# Patient Record
Sex: MALE | Race: ASIAN | NOT HISPANIC OR LATINO | Employment: OTHER | URBAN - METROPOLITAN AREA
[De-identification: names, ages, dates, MRNs, and addresses within clinical notes are randomized per-mention and may not be internally consistent; named-entity substitution may affect disease eponyms.]

---

## 2024-03-15 ENCOUNTER — OFFICE VISIT (OUTPATIENT)
Dept: URGENT CARE | Facility: PHYSICIAN GROUP | Age: 74
End: 2024-03-15
Payer: COMMERCIAL

## 2024-03-15 ENCOUNTER — HOSPITAL ENCOUNTER (OUTPATIENT)
Facility: MEDICAL CENTER | Age: 74
End: 2024-03-15
Attending: PHYSICIAN ASSISTANT
Payer: COMMERCIAL

## 2024-03-15 VITALS
SYSTOLIC BLOOD PRESSURE: 114 MMHG | TEMPERATURE: 98.2 F | HEIGHT: 70 IN | WEIGHT: 212 LBS | DIASTOLIC BLOOD PRESSURE: 62 MMHG | HEART RATE: 97 BPM | RESPIRATION RATE: 18 BRPM | OXYGEN SATURATION: 97 % | BODY MASS INDEX: 30.35 KG/M2

## 2024-03-15 DIAGNOSIS — N30.01 ACUTE CYSTITIS WITH HEMATURIA: ICD-10-CM

## 2024-03-15 LAB
APPEARANCE UR: ABNORMAL
BILIRUB UR STRIP-MCNC: NEGATIVE MG/DL
COLOR UR AUTO: ABNORMAL
GLUCOSE UR STRIP.AUTO-MCNC: NEGATIVE MG/DL
KETONES UR STRIP.AUTO-MCNC: NEGATIVE MG/DL
LEUKOCYTE ESTERASE UR QL STRIP.AUTO: ABNORMAL
NITRITE UR QL STRIP.AUTO: POSITIVE
PH UR STRIP.AUTO: 6 [PH] (ref 5–8)
PROT UR QL STRIP: 100 MG/DL
RBC UR QL AUTO: ABNORMAL
SP GR UR STRIP.AUTO: 1.01
UROBILINOGEN UR STRIP-MCNC: 2 MG/DL

## 2024-03-15 PROCEDURE — 3074F SYST BP LT 130 MM HG: CPT | Performed by: PHYSICIAN ASSISTANT

## 2024-03-15 PROCEDURE — 99203 OFFICE O/P NEW LOW 30 MIN: CPT | Performed by: PHYSICIAN ASSISTANT

## 2024-03-15 PROCEDURE — 87186 SC STD MICRODIL/AGAR DIL: CPT

## 2024-03-15 PROCEDURE — 87077 CULTURE AEROBIC IDENTIFY: CPT

## 2024-03-15 PROCEDURE — 3078F DIAST BP <80 MM HG: CPT | Performed by: PHYSICIAN ASSISTANT

## 2024-03-15 PROCEDURE — 81002 URINALYSIS NONAUTO W/O SCOPE: CPT | Performed by: PHYSICIAN ASSISTANT

## 2024-03-15 PROCEDURE — 87086 URINE CULTURE/COLONY COUNT: CPT

## 2024-03-15 RX ORDER — SULFAMETHOXAZOLE AND TRIMETHOPRIM 800; 160 MG/1; MG/1
1 TABLET ORAL 2 TIMES DAILY
Qty: 28 TABLET | Refills: 0 | Status: SHIPPED | OUTPATIENT
Start: 2024-03-15 | End: 2024-03-29

## 2024-03-15 RX ORDER — TIRZEPATIDE 7.5 MG/.5ML
INJECTION, SOLUTION SUBCUTANEOUS
COMMUNITY

## 2024-03-15 ASSESSMENT — ENCOUNTER SYMPTOMS
MYALGIAS: 1
CHILLS: 1
FLANK PAIN: 1
FEVER: 1

## 2024-03-16 NOTE — PROGRESS NOTES
"Subjective:   Rubén David is a 73 y.o. male who presents for Fever (Xlast night. Dizzy, headache, chills, pain in knees and back. Pt had a catheter removed two days ago. Painful urination, orange)      Is a pleasant 73-year-old male, had a bladder obstruction and difficulty voiding last week, at outside ER and hospital he had a temporary catheter put in, saw urology 2 days ago for catheter removal and has been able to void since, had a normal postvoid residual ultrasound in office however began developing dysuria yesterday evening and into the night started having some fevers body aches dizziness and flank pain.  Has follow-up with urology in around 10 days    Review of Systems   Constitutional:  Positive for chills, fever and malaise/fatigue.   Genitourinary:  Positive for dysuria, flank pain and hematuria.   Musculoskeletal:  Positive for myalgias.       Medications, Allergies, and current problem list reviewed today in Epic.     Objective:     /62   Pulse 97   Temp 36.8 °C (98.2 °F) (Temporal)   Resp 18   Ht 1.778 m (5' 10\")   Wt 96.2 kg (212 lb)   SpO2 97%     Physical Exam  Vitals reviewed.   Constitutional:       Appearance: Normal appearance.   HENT:      Head: Normocephalic and atraumatic.      Right Ear: External ear normal.      Left Ear: External ear normal.      Nose: Nose normal.      Mouth/Throat:      Mouth: Mucous membranes are moist.   Eyes:      Conjunctiva/sclera: Conjunctivae normal.   Cardiovascular:      Rate and Rhythm: Normal rate.   Pulmonary:      Effort: Pulmonary effort is normal.   Abdominal:      Tenderness: There is right CVA tenderness and left CVA tenderness.   Skin:     General: Skin is warm and dry.      Capillary Refill: Capillary refill takes less than 2 seconds.   Neurological:      Mental Status: He is alert and oriented to person, place, and time.         Assessment/Plan:     Diagnosis and associated orders:     1. Acute cystitis with hematuria  POCT " Urinalysis    sulfamethoxazole-trimethoprim (BACTRIM DS) 800-160 MG tablet    URINE CULTURE(NEW)         Comments/MDM:     Urinalysis consistent with cystitis due to his constitutional symptoms as well but is his being a male this is a complicated urinary tract infection likely pyelonephritis.  Will put him on the recommended twice daily Bactrim for 2 weeks with further therapy at the discretion of results of culture and sensitivity.  Recommend close follow-up with urology.  He does not appear to have current obstruction but likely post catheter UTI         Differential diagnosis, natural history, supportive care, and indications for immediate follow-up discussed.    Advised the patient to follow-up with the primary care physician for recheck, reevaluation, and consideration of further management.    Please note that this dictation was created using voice recognition software. I have made a reasonable attempt to correct obvious errors, but I expect that there are errors of grammar and possibly content that I did not discover before finalizing the note.    This note was electronically signed by Solitario Osorio PA-C

## 2024-03-18 LAB
BACTERIA UR CULT: ABNORMAL
BACTERIA UR CULT: ABNORMAL
SIGNIFICANT IND 70042: ABNORMAL
SITE SITE: ABNORMAL
SOURCE SOURCE: ABNORMAL